# Patient Record
Sex: FEMALE | Race: WHITE | Employment: FULL TIME | URBAN - METROPOLITAN AREA
[De-identification: names, ages, dates, MRNs, and addresses within clinical notes are randomized per-mention and may not be internally consistent; named-entity substitution may affect disease eponyms.]

---

## 2017-04-05 ENCOUNTER — EMPLOYEE WELLNESS (OUTPATIENT)
Dept: OTHER | Age: 42
End: 2017-04-05

## 2017-04-05 LAB
CHOLESTEROL, TOTAL: 205 MG/DL (ref 0–199)
GLUCOSE BLD-MCNC: 67 MG/DL (ref 70–99)
HDLC SERPL-MCNC: 74 MG/DL (ref 40–60)
LDL CHOLESTEROL CALCULATED: 115 MG/DL
TRIGL SERPL-MCNC: 80 MG/DL (ref 0–150)

## 2018-03-20 VITALS — WEIGHT: 145 LBS | BODY MASS INDEX: 27.4 KG/M2

## 2020-09-21 ENCOUNTER — OFFICE VISIT (OUTPATIENT)
Dept: ORTHOPEDIC SURGERY | Age: 45
End: 2020-09-21
Payer: COMMERCIAL

## 2020-09-21 VITALS — TEMPERATURE: 97.4 F | WEIGHT: 156 LBS | HEIGHT: 61 IN | BODY MASS INDEX: 29.45 KG/M2

## 2020-09-21 PROCEDURE — 99203 OFFICE O/P NEW LOW 30 MIN: CPT | Performed by: ORTHOPAEDIC SURGERY

## 2020-09-21 PROCEDURE — G8419 CALC BMI OUT NRM PARAM NOF/U: HCPCS | Performed by: ORTHOPAEDIC SURGERY

## 2020-09-21 PROCEDURE — 1036F TOBACCO NON-USER: CPT | Performed by: ORTHOPAEDIC SURGERY

## 2020-09-21 PROCEDURE — G8427 DOCREV CUR MEDS BY ELIG CLIN: HCPCS | Performed by: ORTHOPAEDIC SURGERY

## 2020-09-21 RX ORDER — ESCITALOPRAM OXALATE 5 MG/1
TABLET ORAL
COMMUNITY

## 2020-09-21 RX ORDER — ALPRAZOLAM 1 MG/1
1 TABLET ORAL NIGHTLY PRN
COMMUNITY

## 2020-09-21 RX ORDER — ZOLPIDEM TARTRATE 5 MG/1
5 TABLET ORAL NIGHTLY PRN
COMMUNITY

## 2020-09-21 NOTE — LETTER
Physical Therapy Rehabilitation Referral    Patient Name: Nathalie Ojeda      YOB: 1975    Diagnosis:    Left lower extremity contusions-hip, ankle, knee  Precautions:   none  Date of Prescription:  9/21/20    [x] Evaluate and Treat        Modalities:    [x] Of Choice        [] Ultrasound       [] Iontophoresis      [] Moist heat     [] Massage         [x] Cryotherapy      [] Electrical stimulation     [] Paraffin  [] Whirlpool  [] TENS    [x] Home exercise program (copy to patient).         Perform exercises for:  15   minutes    2-3   times/day  [x] Supervised physical therapy  Frequency: []  1x week  [x] 2x week  [] 3x week  [] Other:   Duration: [] 2 weeks   [] 4 weeks  [x] 6 weeks  [] Other:     Additional Instructions:   ROM, strengthening exercises for all affected joints    Ankita Ga MD  Orthopaedic Fellow  [facility] and [address] [place] [address]

## 2020-09-21 NOTE — PROGRESS NOTES
[address]  Office Visit  New Patient  Date:  2020    Name:  Kelly Villa  Address:  [address] [place] [address] [address] [place] [address] WAmrita Dang [place]    :  1975      Age:   53 y.o.    SSN:  [id number]      Medical Record Number:  <[id number]>    Chief Complaint:    Left lower extremity pains    HPI:   Kelly Villa is a 53 y.o. female who is presenting for planes of left lower extremity pains. This is a Workmen's Compensation injury through [place]. She works at [employer] at [employer]. On  she was getting some medications utilizing a step stool on wheels which tipped over and she landed directly on her left side. She was evaluated and having back pain, left hip pain, knee pain, ankle pain. Her ankle and hip and back have improved but her knee pain has persisted. She notices pain/soreness with extended periods of weightbearing that are improved with rest.  Her primary care physician provided some narcotic pain medication along with anti-inflammatories which provide some relief. She has not had any history of physical therapy or intra-articular steroid injections. She describes no mechanical symptoms and no instability. She denies any new numbness, tingling, fevers, chills, chest pain, shortness of breath, or any other new significant symptoms.     Pain Assessment  Location of Pain: Knee  Location Modifiers: Left  Severity of Pain: 7  Quality of Pain: Throbbing, Aching, Sharp(spams)  Duration of Pain: Persistent  Frequency of Pain: Constant  Date Pain First Started: 20  Aggravating Factors: Bending, Walking  Limiting Behavior: Yes  Relieving Factors: Rest, Ice, Nsaids  Result of Injury: Yes  Work-Related Injury: Yes  Are there other pain locations you wish to document?: No    Past History:  Past Medical History:   Diagnosis Date    Anxiety     Sinus headache        Past Surgical History:   Procedure Laterality Date     SECTION, CLASSIC      x2  SINUS SURGERY      TUBAL LIGATION         Social History     Tobacco Use    Smoking status: Never Smoker    Smokeless tobacco: Never Used   Substance Use Topics    Alcohol use: No    Drug use: No        Family History:  family history includes Other in her father. Current Outpatient Medications:     escitalopram (LEXAPRO) 5 MG tablet, Take 5 mg by mouth every other day, Disp: , Rfl:     ALPRAZolam (XANAX) 1 MG tablet, Take 1 mg by mouth nightly as needed. , Disp: , Rfl:     zolpidem (AMBIEN) 5 MG tablet, Take 5 mg by mouth nightly as needed for Sleep., Disp: , Rfl:     escitalopram (LEXAPRO) 10 MG tablet, Take 10 mg by mouth every other day, Disp: , Rfl:     ibuprofen (ADVIL) 200 MG tablet, Take 2 tablets by mouth every 8 hours as needed, Disp: 120 tablet, Rfl: 0    alprazolam (XANAX) 0.5 MG tablet, Take 1 mg by mouth nightly as needed. , Disp: , Rfl:       No Known Allergies      Review of Systems: A 14 point review of systems was completed by the patient on 9/21/20 and is available in the media section of the scanned medical record and was reviewed today  The review is negative with the exception of those things mentioned in the HPI    No notes on file    Physical Exam:  Temp 97.4 °F (36.3 °C)   Ht 5' 1\" (1.549 m)   Wt 156 lb (70.8 kg)   BMI 29.48 kg/m²         General: No acute distress, well nourished  CV: No obvious peripheral edema.  Normal peripheral pulses  Neuro: Alert & oriented x 3  Psych: Appropriate mood and affect    Ankle/foot Examination: Left     Inspection:  No edema or wounds  Palpation: no tenderness over medial and lateral malleoli, anterior joint line, Achilles, base of the fifth metatarsal.  Range of Motion:  20 dorsiflexion, 40 plantarflexion  Strength:  5 /5 plantarflexion, 5/5 dorsiflexion, 5/5 inversion and eversion  Stability: neg anterior drawer and talar tilt  Neuro: Sensation equal bilateral lower extremities   Vascular: 2+ posterior tibialis pulse        Left encounter with Alvarez Ray was supervised by Dr Melonie Mcdaniel who personally examined the patient and reviewed the plan. This dictation was performed with a verbal recognition program (DRAGON) and it was checked for errors. It is possible that there are still dictated errors within this office note. If so, please bring any errors to my attention for an addendum. All efforts were made to ensure that this office note is accurate. Attestation:  I was physically present and performed my own examination of this patient and have discussed the case, including pertinent history and exam findings with the fellow. I agree with the documented assessment and plan. Darian Jaquez.  Melonie Mcdaniel MD

## 2020-09-23 ENCOUNTER — HOSPITAL ENCOUNTER (OUTPATIENT)
Dept: PHYSICAL THERAPY | Age: 45
Setting detail: THERAPIES SERIES
Discharge: HOME | End: 2020-09-23
Payer: COMMERCIAL

## 2020-09-23 PROCEDURE — 97110 THERAPEUTIC EXERCISES: CPT

## 2020-09-23 PROCEDURE — 97161 PT EVAL LOW COMPLEX 20 MIN: CPT

## 2020-09-23 PROCEDURE — 97530 THERAPEUTIC ACTIVITIES: CPT

## 2020-09-23 NOTE — PLAN OF CARE
[address] [address], [address]  Phone: [phone]   Fax: [phone]                                                       Physical Therapy Certification    Dear Referring Practitioner: Linda Witt MD,    We had the pleasure of evaluating the following patient for physical therapy services at [address] [place] [address]. A summary of our findings can be found in the initial assessment below. This includes our plan of care. If you have any questions or concerns regarding these findings, please do not hesitate to contact me at the office phone number checked above. Thank you for the referral.       Physician Signature:_______________________________Date:__________________  By signing above (or electronic signature), therapists plan is approved by physician      Patient: Jacque Goldberg   : 1975   MRN: [id number]  Referring Physician: Referring Practitioner: Linda Witt MD      Evaluation Date: 2020      Medical Diagnosis Information:  Diagnosis: S83. 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 She had low back, L Hip/Knee/Ankle pain immediately where sent went to ER. X-rays completed of all levels and were all normal. PCP gave patient muscle relaxer and pain medication that she took and improved pain slightly but still has significant knee pain.  She saw  Arnulfo Fuchs on Monday where he did a repeat x-ray and put her off work for 4 weeks, prescribing a NSAID and PT. Since incident, the knee pain has stayed the same. States she has muscle spasms in her quad at times that results in her feeling quivering of the muscle and tingling after. She states she lives on the third floor of an apartment complex and is going up the stairs backwards on her butt because she is unwilling to bend her knee. 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        OBJECTIVE:   Palpation: pain present with palpation of quad muscle belly, trigger points noted    Functional Mobility/Transfers: VERY GUARDED, does not allow for knee flexion with sit<>stand, in sitting, or any bed mobility    Posture: locked into knee extension on L, decreased WBing on L    Bandages/Dressings/Incisions: n/a    Gait: very antalgic/abnormal, locks L knee into extension as if in brace, does not allow for any knee flexion; foot flat contact, walking like L leg is single bone     Dermatomes Normal Abnormal Comments   inguinal area (L1)       anterior mid-thigh (L2) x     distal ant thigh/med knee (L3) x     medial lower leg and foot (L4) x     lateral lower leg and foot (L5) x     posterior calf (S1) x     medial calcaneus (S2) x         Reflexes Normal Abnormal Comments   S1-2 Seated achilles      S1-2 Prone knee bend      L3-4 ROM   []Decreased core/proximal hip strength and neuromuscular control   [x]Decreased LE functional strength   [x]Reduced balance/proprioceptive control   []other:      Functional Activity Limitations (from functional questionnaire and intake)   [x]Reduced ability to tolerate prolonged functional positions   [x]Reduced ability or difficulty with changes of positions or transfers between positions   [x]Reduced ability to maintain good posture and demonstrate good body mechanics with sitting, bending, and lifting   [x]Reduced ability to sleep   [x] Reduced ability or tolerance with driving and/or computer work   [x]Reduced ability to perform lifting, carrying tasks   [x]Reduced ability to squat   [x]Reduced ability to forward bend   [x]Reduced ability to ambulate prolonged functional periods/distances/surfaces   [x]Reduced ability to ascend/descend stairs   [x]Reduced ability to run, hop, cut or jump   []other:    Participation Restrictions   [x]Reduced participation in self care activities   [x]Reduced participation in home management activities   [x]Reduced participation in work activities   [x]Reduced participation in social activities. []Reduced participation in sport/recreation activities. Classification :    []Signs/symptoms consistent with post-surgical status including decreased ROM, strength and function.    [x]Signs/symptoms consistent with joint sprain/strain   []Signs/symptoms consistent with patella-femoral syndrome   []Signs/symptoms consistent with knee OA/hip OA   []Signs/symptoms consistent with internal derangement of knee/Hip   []Signs/symptoms consistent with functional hip weakness/NMR control      []Signs/symptoms consistent with tendinitis/tendinosis    []signs/symptoms consistent with pathology which may benefit from Dry needling      []other:      Prognosis/Rehab Potential:      []Excellent   [x]Good    []Fair   []Poor    Tolerance of evaluation/treatment: Access Code: [email or web address]   URL: [email or web address]   Date: 09/23/2020   Prepared by: Daniela Polanco     Exercises   Supine Heel Slide with Strap - 10 reps - 10s hold - 3x daily - 7x weekly   Prone Quadriceps Stretch with Strap - 3 reps - 30s hold - 3x daily - 7x weekly   Prone Knee Flexion - 10 reps - 3 sets - 2-3x daily - 7x weekly   Hip Extension with Leg Bent - 10 reps - 3 sets - 1x daily - 7x weekly   Standing Knee Flexion AROM with Chair Support - 10 reps - 3 sets - 2-3x daily - 7x weekly     GOALS:  Patient stated goal: return to walking normal, return to work  [] Progressing: [] Met: [] Not Met: [] Adjusted    Therapist goals for Patient:   Short Term Goals: To be achieved in: 2 weeks  1. Independent in HEP and progression per patient tolerance, in order to prevent re-injury. [] Progressing: [] Met: [] Not Met: [] Adjusted  2. Patient will have a decrease in pain to facilitate improvement in movement, function, and ADLs as indicated by Functional Deficits. [] Progressing: [] Met: [] Not Met: [] Adjusted    Long Term Goals: To be achieved in: 6 weeks  1. Disability index score of 20% or less for the LEFS to assist with reaching prior level of function. [] Progressing: [] Met: [] Not Met: [] Adjusted  2. Patient will demonstrate increased AROM to 0-140 to allow for proper joint functioning as indicated by patients Functional Deficits. [] Progressing: [] Met: [] Not Met: [] Adjusted  3. Patient will demonstrate an increase in Strength to at least 5/5 as well as good proximal hip strength and control to allow for proper functional mobility as indicated by patients Functional Deficits. [] Progressing: [] Met: [] Not Met: [] Adjusted  4. Patient will return to functional activities including walking and stair navigation without increased symptoms or restriction. [] Progressing: [] Met: [] Not Met: [] Adjusted  5.  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     [] Progressing: [] Met: [] Not Met: [] Adjusted     Electronically signed by:  Danica Palomares, PT, DPT

## 2020-09-23 NOTE — FLOWSHEET NOTE
[address] [facility] Physical Therapy  Phone: [phone]   Fax: [phone]    Physical Therapy Treatment Note/ Progress Report:     Date:  2020    Patient Name:  Ling Zavala    :  1975  MRN: [id number]  Restrictions/Precautions:    Medical/Treatment Diagnosis Information:  Diagnosis: S83. 92XA (ICD-10-CM) - Sprain of unspecified site of left knee, initial encounter  Treatment Diagnosis: fear/guarding of L knee flexion, decreased L knee ROM, strength, poor quad activation, abnormal gait   Insurance/Certification information:  PT Insurance Information: Worker's Comp  Physician Information:  Referring Practitioner: Gabi Read MD  Plan of care signed (Y/N): []  Yes [x]  No     Date of Patient follow up with Physician: 10/23/2020     Progress Report: [x]  Yes  []  No     Date Range for reporting period:  Beginnin2020  Ending:     Progress report due (10 Rx/or 30 days whichever is less): visit #10 or      Recertification due (POC duration/ or 90 days whichever is less): visit #10 or 2020 (end of presumptive visits)     Visit # Insurance Allowable Auth required?  Date Range   1 10 []  Yes  [x]  No 10 presumptive visits thru 2020     Latex Allergy:  [x]NO      []YES  Preferred Language for Healthcare:   [x]English       []other:    Functional Scale:        Date assessed:  LEFS: raw score = 12; dysfunction = 85%   2020    Pain level:  4-8/10     SUBJECTIVE:  See eval    OBJECTIVE: See eval      RESTRICTIONS/PRECAUTIONS: n/a    Exercises/Interventions:     Therapeutic Exercise (66360)  Resistance / level Sets/sec Reps Notes / Cues   Bike              Prone Quad Stretch  30\" 3    Heel Slides  10\" 5           Prone Knee Flexion  1 10    Prone Hip Ext w/ Knee Flexed  1 10    Standing HS Curl  1 10                                Therapeutic Activities (42665)                                   Neuromuscular Re-ed (76938)                            Manual Intervention ([id number])       Knee PROM       Tib/Fem Mobs       STM Quad npv      Ankle mobs                         Pt. Education:  -pt educated on diagnosis, prognosis and expectations for rehab  -all pt questions were answered    Home Exercise Program:  Access Code: [email or web address]   URL: [employer]/   Date: 09/23/2020   Prepared by: Rue Fresh     Exercises   · Supine Heel Slide with Strap - 10 reps - 10s hold - 3x daily - 7x weekly   · Prone Quadriceps Stretch with Strap - 3 reps - 30s hold - 3x daily - 7x weekly   · Prone Knee Flexion - 10 reps - 3 sets - 2-3x daily - 7x weekly   · Hip Extension with Leg Bent - 10 reps - 3 sets - 1x daily - 7x weekly   · Standing Knee Flexion AROM with Chair Support - 10 reps - 3 sets - 2-3x daily - 7x weekly     Therapeutic Exercise and NMR EXR  [x] (60360) Provided verbal/tactile cueing for activities related to strengthening, flexibility, endurance, ROM for improvements in LE, proximal hip, and core control with self care, mobility, lifting, ambulation.  [] (94393) Provided verbal/tactile cueing for activities related to improving balance, coordination, kinesthetic sense, posture, motor skill, proprioception  to assist with LE, proximal hip, and core control in self care, mobility, lifting, ambulation and eccentric single leg control.   [] (83548) Therapist is in constant attendance of 2 or more patients providing skilled therapy interventions, but not providing any significant amount of measurable one-on-one time to either patient, for improvements in LE, proximal hip, and core control in self care, mobility, lifting, ambulation and eccentric single leg control.      NMR and Therapeutic Activities:    [x] (52318 or 60933) Provided verbal/tactile cueing for activities related to improving balance, coordination, kinesthetic sense, posture, motor skill, proprioception and motor activation to allow for proper function of core, proximal hip and LE with self care and ADLs  [] (16117) Gait Re-education- Provided training and instruction to the patient for proper LE, core and proximal hip recruitment and positioning and eccentric body weight control with ambulation re-education including up and down stairs     Home Exercise Program:    [x] (69814) Reviewed/Progressed HEP activities related to strengthening, flexibility, endurance, ROM of core, proximal hip and LE for functional self-care, mobility, lifting and ambulation/stair navigation   [] (22111)Reviewed/Progressed HEP activities related to improving balance, coordination, kinesthetic sense, posture, motor skill, proprioception of core, proximal hip and LE for self care, mobility, lifting, and ambulation/stair navigation      Manual Treatments:  PROM / STM / Oscillations-Mobs:  G-I, II, III, IV (PA's, Inf., Post.)  [x] (36764) Provided manual therapy to mobilize LE, proximal hip and/or LS spine soft tissue/joints for the purpose of modulating pain, promoting relaxation,  increasing ROM, reducing/eliminating soft tissue swelling/inflammation/restriction, improving soft tissue extensibility and allowing for proper ROM for normal function with self care, mobility, lifting and ambulation. Modalities:  [] (11973) Vasopneumatic compression: Utilized vasopneumatic compression to decrease edema / swelling for the purpose of improving mobility and quad tone / recruitment which will allow for increased overall function including but not limited to self-care, transfers, ambulation, and ascending / descending stairs.        Modalities:      Charges:  Timed Code Treatment Minutes: 30   Total Treatment Minutes: 50     [x] EVAL - LOW (69859)   [] EVAL - MOD (41373)  [] EVAL - HIGH (21828)  [] RE-EVAL (24021)  [x] RA(79537) x 1     [] Ionto  [] NMR (69307) x      [] Vaso  [] Manual (75527) x      [] Ultrasound  [x] TA x 1      [] Mech Traction (66141)  [] Aquatic Therapy x     [] ES (un) (08379):   [] Home Management Training x [] ES(attended) (16845)   [] Group:     [] Other:     GOALS:   Patient stated goal: return to walking normal, return to work  []? Progressing: []? Met: []? Not Met: []? Adjusted     Therapist goals for Patient:   Short Term Goals: To be achieved in: 2 weeks  1. Independent in HEP and progression per patient tolerance, in order to prevent re-injury. []? Progressing: []? Met: []? Not Met: []? Adjusted  2. Patient will have a decrease in pain to facilitate improvement in movement, function, and ADLs as indicated by Functional Deficits. []? Progressing: []? Met: []? Not Met: []? Adjusted     Long Term Goals: To be achieved in: 6 weeks  1. Disability index score of 20% or less for the LEFS to assist with reaching prior level of function. []? Progressing: []? Met: []? Not Met: []? Adjusted  2. Patient will demonstrate increased AROM to 0-140 to allow for proper joint functioning as indicated by patients Functional Deficits. []? Progressing: []? Met: []? Not Met: []? Adjusted  3. Patient will demonstrate an increase in Strength to at least 5/5 as well as good proximal hip strength and control to allow for proper functional mobility as indicated by patients Functional Deficits. []? Progressing: []? Met: []? Not Met: []? Adjusted  4. Patient will return to functional activities including walking and stair navigation without increased symptoms or restriction. []? Progressing: []? Met: []? Not Met: []? Adjusted  5. 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 []? Progressing: []? Met: []? Not Met: []? Adjusted     Overall Progression Towards Functional goals/ Treatment Progress Update:  [] Patient is progressing as expected towards functional goals listed. [] Progression is slowed due to complexities/Impairments listed. [] Progression has been slowed due to co-morbidities.   [x] Plan just implemented, too soon to assess goals progression <30days   [] Goals require adjustment due to lack of progress  [] Patient is not progressing as expected and requires additional follow up with physician  [] Other    Persisting Functional Limitations/Impairments:  [x]Sitting [x]Standing   [x]Walking [x]Stairs   [x]Transfers [x]ADLs   [x]Squatting/bending [x]Kneeling  [x]Housework [x]Job related tasks  [x]Driving []Sports/Recreation   [x]Sleeping []Other:    ASSESSMENT:  See eval    Treatment/Activity Tolerance:  [] Pt able to complete treatment [] Patient limited by fatique  [x] Patient limited by pain  [] Patient limited by other medical complications  [] Other:     Prognosis: [x] Good [] Fair  [] Poor    Patient Requires Follow-up: [x] Yes  [] No    Return to Play:    [x]  N/A    PLAN: See eval. PT 1-2x / week for 6 weeks. 10 presumptive visits. [] Continue per plan of care [] Alter current plan (see comments)  [x] Plan of care initiated [] Hold pending MD visit [] Discharge    Electronically signed by: Herbert Madera PT, DPT      Note: If patient does not return for scheduled/ recommended follow up visits, this note will serve as a discharge from care along with most recent update on progress.

## 2020-09-24 ENCOUNTER — TELEPHONE (OUTPATIENT)
Dept: ORTHOPEDIC SURGERY | Age: 45
End: 2020-09-24

## 2020-09-24 NOTE — TELEPHONE ENCOUNTER
COMPLETED AND EMAILED RTW FORM FOR MARGA TO Nicho RODRIGUEZ/DR. SOSA TO OBTAIN SIGNATURE.  ASKED THAT SHE HAVE SIGNED AND THEN FAX ON AND SCAN INTO [employer] WITH CONFIRMATION

## 2020-09-30 ENCOUNTER — HOSPITAL ENCOUNTER (OUTPATIENT)
Dept: PHYSICAL THERAPY | Age: 45
Setting detail: THERAPIES SERIES
Discharge: HOME | End: 2020-09-30
Payer: COMMERCIAL

## 2020-09-30 NOTE — PROGRESS NOTES
Physical Therapy  Cancellation/No-show Note  Patient Name:  Dm Rogers  :  1975   Date:  2020  Cancelled visits to date: 1  No-shows to date: 0    Patient status for today's appointment patient:  [x]  Cancelled    []  Rescheduled appointment  []  No-show       Reason given by patient:  []  Patient ill  []  Conflicting appointment  []  No transportation    []  Conflict with work  []  No reason given  [x]  Other:     Comments:   - family emergency     Phone call information:   []  Phone call made today to patient at _ time at number provided:      []  Patient answered, conversation as follows:    []  Patient did not answer, message left as follows:  [x]  Phone call not made today  [] Phone call not made today - patient called in and provided reason for cancellation    Electronically signed by:  Reny Reynaga, PT, DPT

## 2020-10-02 ENCOUNTER — HOSPITAL ENCOUNTER (OUTPATIENT)
Dept: PHYSICAL THERAPY | Age: 45
Setting detail: THERAPIES SERIES
Discharge: HOME | End: 2020-10-02
Payer: COMMERCIAL

## 2020-10-02 NOTE — PROGRESS NOTES
Physical Therapy  Cancellation/No-show Note  Patient Name:  Sha Keen  :  1975   Date:  10/2/2020  Cancelled visits to date: 2  No-shows to date: 0    Patient status for today's appointment patient:  [x]  Cancelled , 10/2   []  Rescheduled appointment  []  No-show       Reason given by patient:  []  Patient ill  []  Conflicting appointment  []  No transportation    []  Conflict with work  []  No reason given  [x]  Other:     Comments:  10/2 - family emergency     Phone call information:   []  Phone call made today to patient at _ time at number provided:      []  Patient answered, conversation as follows:    []  Patient did not answer, message left as follows:  []  Phone call not made today  [x] Phone call not made today - patient called in and provided reason for cancellation    Electronically signed by:  Jaron King, PT, DPT

## 2020-10-06 ENCOUNTER — HOSPITAL ENCOUNTER (OUTPATIENT)
Dept: PHYSICAL THERAPY | Age: 45
Setting detail: THERAPIES SERIES
Discharge: HOME | End: 2020-10-06
Payer: COMMERCIAL

## 2020-10-06 PROCEDURE — 97140 MANUAL THERAPY 1/> REGIONS: CPT

## 2020-10-06 PROCEDURE — 97112 NEUROMUSCULAR REEDUCATION: CPT

## 2020-10-06 PROCEDURE — 97110 THERAPEUTIC EXERCISES: CPT

## 2020-10-06 NOTE — FLOWSHEET NOTE
[address] [facility] Physical Therapy  Phone: [phone]   Fax: [phone]    Physical Therapy Treatment Note/ Progress Report:     Date:  10/6/2020    Patient Name:  Dyllan Najera    :  1975  MRN: [id number]  Restrictions/Precautions:    Medical/Treatment Diagnosis Information:  Diagnosis: S83. 92XA (ICD-10-CM) - Sprain of unspecified site of left knee, initial encounter  Treatment Diagnosis: fear/guarding of L knee flexion, decreased L knee ROM, strength, poor quad activation, abnormal gait   Insurance/Certification information:  PT Insurance Information: Worker's Comp  Physician Information:  Referring Practitioner: Annabella Kirby MD  Plan of care signed (Y/N): [x]  Yes []  No     Date of Patient follow up with Physician: 10/23/2020     Progress Report: []  Yes  [x]  No     Date Range for reporting period:  Beginnin2020  Ending:     Progress report due (10 Rx/or 30 days whichever is less): visit #10 or      Recertification due (POC duration/ or 90 days whichever is less): visit #10 or 2020 (end of presumptive visits)     Visit # Insurance Allowable Auth required? Date Range   2 10 []  Yes  [x]  No 10 presumptive visits thru 2020     Latex Allergy:  [x]NO      []YES  Preferred Language for Healthcare:   [x]English       []other:    Functional Scale:        Date assessed:  LEFS: raw score = 12; dysfunction = 85%   2020    Pain level:  3-4/10     SUBJECTIVE:  Pt reports her movement is so much better in her knee and pain is significantly less by performing the HEP daily. She states she is incredibly happy she is progressing. Still having pain with stairs and long duration walking.      OBJECTIVE: See eval      RESTRICTIONS/PRECAUTIONS: n/a    Exercises/Interventions:     Therapeutic Exercise (27406)  Resistance / level Sets/sec Reps Notes / Cues   Bike  5'            Standing Knee Flexor Stretch  10\" 10    Prone Quad Stretch  30\" 3    Leg off EOB Hip Flexor Stretch  2'     Heel Slides            Prone Knee Flexion 2# 3\" hold 20    Prone Hip Ext w/ Knee Flexed Pillow under hips 2 10    Standing HS Curl  2 10                                Therapeutic Activities (89127)       Squat  1 15    Stair Navigation Ascend/  descend 3 steps 5 Cueing for alignment, performance, and eliminating compensations                  Neuromuscular Re-ed (93903)       LSU 4\" step 2 10    SLS w/ clock taps  3 taps 10           Manual Intervention (05342)       Knee PROM       Tib/Fem Mobs       STM Quad & Pinch then Stretch  10'     Stick to [employer]  5'  Performed in long and short sit                     Pt. Education:  -pt educated on diagnosis, prognosis and expectations for rehab  -all pt questions were answered    Home Exercise Program:  Access Code: [email or web address]   URL: [employer]/   Date: 09/23/2020   Prepared by: Baron Orta     Exercises   · Supine Heel Slide with Strap - 10 reps - 10s hold - 3x daily - 7x weekly   · Prone Quadriceps Stretch with Strap - 3 reps - 30s hold - 3x daily - 7x weekly   · Prone Knee Flexion - 10 reps - 3 sets - 2-3x daily - 7x weekly   · Hip Extension with Leg Bent - 10 reps - 3 sets - 1x daily - 7x weekly   · Standing Knee Flexion AROM with Chair Support - 10 reps - 3 sets - 2-3x daily - 7x weekly     Date: 10/06/2020 (same code)  Exercises   Supine Quadriceps Stretch with Strap on Table - 10 reps - 30s hold - 1x daily - 7x weekly   Squat with Chair Touch - 10 reps - 3 sets - 1x daily - 7x weekly   Lateral Step Down - 10 reps - 3 sets - 1x daily - 7x weekly   Single Leg Balance with Clock Reach - 10 reps - 3 sets - 1x daily - 7x weekly     Therapeutic Exercise and NMR EXR  [x] (26039) Provided verbal/tactile cueing for activities related to strengthening, flexibility, endurance, ROM for improvements in LE, proximal hip, and core control with self care, mobility, lifting, ambulation.  [] (11560) Provided verbal/tactile cueing for activities related to improving balance, coordination, kinesthetic sense, posture, motor skill, proprioception  to assist with LE, proximal hip, and core control in self care, mobility, lifting, ambulation and eccentric single leg control.   [] (20530) Therapist is in constant attendance of 2 or more patients providing skilled therapy interventions, but not providing any significant amount of measurable one-on-one time to either patient, for improvements in LE, proximal hip, and core control in self care, mobility, lifting, ambulation and eccentric single leg control.      NMR and Therapeutic Activities:    [x] (71153 or 75357) Provided verbal/tactile cueing for activities related to improving balance, coordination, kinesthetic sense, posture, motor skill, proprioception and motor activation to allow for proper function of core, proximal hip and LE with self care and ADLs  [] (98895) Gait Re-education- Provided training and instruction to the patient for proper LE, core and proximal hip recruitment and positioning and eccentric body weight control with ambulation re-education including up and down stairs     Home Exercise Program:    [x] (23605) Reviewed/Progressed HEP activities related to strengthening, flexibility, endurance, ROM of core, proximal hip and LE for functional self-care, mobility, lifting and ambulation/stair navigation   [] (88019)Reviewed/Progressed HEP activities related to improving balance, coordination, kinesthetic sense, posture, motor skill, proprioception of core, proximal hip and LE for self care, mobility, lifting, and ambulation/stair navigation      Manual Treatments:  PROM / STM / Oscillations-Mobs:  G-I, II, III, IV (PA's, Inf., Post.)  [x] (03707) Provided manual therapy to mobilize LE, proximal hip and/or LS spine soft tissue/joints for the purpose of modulating pain, promoting relaxation,  increasing ROM, reducing/eliminating soft tissue swelling/inflammation/restriction, improving soft tissue extensibility and allowing for proper ROM for normal function with self care, mobility, lifting and ambulation. Modalities:  [] (18965) Vasopneumatic compression: Utilized vasopneumatic compression to decrease edema / swelling for the purpose of improving mobility and quad tone / recruitment which will allow for increased overall function including but not limited to self-care, transfers, ambulation, and ascending / descending stairs. Modalities:      Charges:  Timed Code Treatment Minutes: 42   Total Treatment Minutes: 42     [] EVAL - LOW (65635)   [] EVAL - MOD (05831)  [] EVAL - HIGH (06730)  [] RE-EVAL (34104)  [x] DK(23132) x 1     [] Ionto  [x] NMR (74860) x 1      [] Vaso  [x] Manual (28817) x 1      [] Ultrasound  [] TA x 1      [] Mech Traction (19832)  [] Aquatic Therapy x     [] ES (un) (81255):   [] Home Management Training x [] ES(attended) (76788)   [] Group:     [] Other:     GOALS:   Patient stated goal: return to walking normal, return to work  []? Progressing: []? Met: []? Not Met: []? Adjusted     Therapist goals for Patient:   Short Term Goals: To be achieved in: 2 weeks  1. Independent in HEP and progression per patient tolerance, in order to prevent re-injury. []? Progressing: []? Met: []? Not Met: []? Adjusted  2. Patient will have a decrease in pain to facilitate improvement in movement, function, and ADLs as indicated by Functional Deficits. []? Progressing: []? Met: []? Not Met: []? Adjusted     Long Term Goals: To be achieved in: 6 weeks  1. Disability index score of 20% or less for the LEFS to assist with reaching prior level of function. []? Progressing: []? Met: []? Not Met: []? Adjusted  2. Patient will demonstrate increased AROM to 0-140 to allow for proper joint functioning as indicated by patients Functional Deficits. []? Progressing: []? Met: []? Not Met: []? Adjusted  3.  Patient will demonstrate an increase in Strength to at least 5/5 as well as good proximal hip strength and control to allow for proper functional mobility as indicated by patients Functional Deficits. []? Progressing: []? Met: []? Not Met: []? Adjusted  4. Patient will return to functional activities including walking and stair navigation without increased symptoms or restriction. []? Progressing: []? Met: []? Not Met: []? Adjusted  5. 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 []? Progressing: []? Met: []? Not Met: []? Adjusted     Overall Progression Towards Functional goals/ Treatment Progress Update:  [] Patient is progressing as expected towards functional goals listed. [] Progression is slowed due to complexities/Impairments listed. [] Progression has been slowed due to co-morbidities. [x] Plan just implemented, too soon to assess goals progression <30days   [] Goals require adjustment due to lack of progress  [] Patient is not progressing as expected and requires additional follow up with physician  [] Other    Persisting Functional Limitations/Impairments:  [x]Sitting [x]Standing   [x]Walking [x]Stairs   [x]Transfers [x]ADLs   [x]Squatting/bending [x]Kneeling  [x]Housework [x]Job related tasks  [x]Driving []Sports/Recreation   [x]Sleeping []Other:    ASSESSMENT:  Pt with much improved AROM and ability to complete exercises with only mild pain. Pt has significant muscle tightness / trigger points in distal quad muscle. Pt able to complete all new exercises as outlined in bold above. Focus on eccentric control of quadriceps at upcoming appts. Pt cont to benefit from skilled PT.      Treatment/Activity Tolerance:  [x] Pt able to complete treatment [] Patient limited by fatique  [x] Patient limited by pain  [] Patient limited by other medical complications  [] Other:     Prognosis: [x] Good [] Fair  [] Poor    Patient Requires Follow-up: [x] Yes  [] No    Return to Play:    [x]  N/A    PLAN: See nancy. PT 1-2x / week for 6 weeks. 10 presumptive visits. [x] Continue per plan of care [] Alter current plan (see comments)  [] Plan of care initiated [] Hold pending MD visit [] Discharge    Electronically signed by: Crystal Sanz PT, DPT      Note: If patient does not return for scheduled/ recommended follow up visits, this note will serve as a discharge from care along with most recent update on progress.

## 2020-10-09 ENCOUNTER — HOSPITAL ENCOUNTER (OUTPATIENT)
Dept: PHYSICAL THERAPY | Age: 45
Setting detail: THERAPIES SERIES
Discharge: HOME | End: 2020-10-09
Payer: COMMERCIAL

## 2020-10-09 NOTE — PROGRESS NOTES
Physical Therapy  Cancellation/No-show Note  Patient Name:  Gricel Patel  :  1975   Date:  10/9/2020  Cancelled visits to date: 2  No-shows to date: 0    Patient status for today's appointment patient:  []  [address] [facility] , 10/2   []  Rescheduled appointment  [x]  No-show 10/9     Reason given by patient:  []  Patient ill  []  Conflicting appointment  []  No transportation    []  Conflict with work  [x]  No reason given  []  Other:     Comments:    Phone call information:   [x]  Phone call made today to patient at 2:47pm at number provided:   [phone]    []  Patient answered, conversation as follows:    [x]  Patient did not answer, message left as follows: Missed todays appt, next appt Tues.  10/13 at 1pm   []  Phone call not made today  [] Phone call not made today - patient called in and provided reason for cancellation    Electronically signed by:  Jessica Warner, PT, DPT

## 2020-10-13 ENCOUNTER — HOSPITAL ENCOUNTER (OUTPATIENT)
Dept: PHYSICAL THERAPY | Age: 45
Setting detail: THERAPIES SERIES
Discharge: HOME | End: 2020-10-13
Payer: COMMERCIAL

## 2020-10-14 ENCOUNTER — TELEPHONE (OUTPATIENT)
Dept: ORTHOPEDIC SURGERY | Age: 45
End: 2020-10-14

## 2020-10-14 NOTE — TELEPHONE ENCOUNTER
GAVE [facility] MEDICAL RECORDS 01/2015 TO PRESENT TO DAHLIA CANO TO SCAN IN MRO TO [address] [place] [address]

## 2020-10-23 ENCOUNTER — HOSPITAL ENCOUNTER (OUTPATIENT)
Dept: PHYSICAL THERAPY | Age: 45
Setting detail: THERAPIES SERIES
Discharge: HOME | End: 2020-10-23
Payer: COMMERCIAL

## 2020-10-23 ENCOUNTER — OFFICE VISIT (OUTPATIENT)
Dept: ORTHOPEDIC SURGERY | Age: 45
End: 2020-10-23
Payer: COMMERCIAL

## 2020-10-23 VITALS — WEIGHT: 156 LBS | HEIGHT: 61 IN | TEMPERATURE: 97.4 F | BODY MASS INDEX: 29.45 KG/M2

## 2020-10-23 PROCEDURE — 99213 OFFICE O/P EST LOW 20 MIN: CPT | Performed by: ORTHOPAEDIC SURGERY

## 2020-10-23 NOTE — PROGRESS NOTES
Physical Therapy  Cancellation/No-show Note  Patient Name:  Nathen Chan  :  1975   Date:  10/23/2020  Cancelled visits to date: 3  No-shows to date: 2    Patient status for today's appointment patient:  [x]  Cancelled , 10/2, 10/23   []  Rescheduled appointment  []  No-show 10/9, 10/13     Reason given by patient:  []  Patient ill  []  Conflicting appointment  []  No transportation    []  Conflict with work  []  No reason given  [x]  Other:     Comments: 10/23 - pt states she wants to make sure PT is covered by worker's comp,  informed her of 10 presumptive visits prior to 2020, she responded that wasn't \"good enough\" and wants to \"be sure\" that PT was covered before she comes back in. States she is expecting a call this afternoon to confirm. Will cancel future appts if she does not get coverage confirmed.      Phone call information:   []  Phone call made today to patient at [facility] at number provided:   [phone]    []  Patient answered, conversation as follows:    []  Patient did not answer, message left as follows:  []  Phone call not made today  [x] Phone call not made today - patient called in and provided reason for cancellation    Electronically signed by:  Phuong Owens, PT, DPT

## 2020-10-27 ENCOUNTER — APPOINTMENT (OUTPATIENT)
Dept: PHYSICAL THERAPY | Age: 45
End: 2020-10-27
Payer: COMMERCIAL

## 2020-10-30 ENCOUNTER — APPOINTMENT (OUTPATIENT)
Dept: PHYSICAL THERAPY | Age: 45
End: 2020-10-30
Payer: COMMERCIAL

## 2020-11-09 ENCOUNTER — TELEPHONE (OUTPATIENT)
Dept: ORTHOPEDIC SURGERY | Age: 45
End: 2020-11-09

## 2020-11-09 NOTE — TELEPHONE ENCOUNTER
Appointment Request     Patient requesting earlier appointment: No  Appointment offered to patient: Nirmal Sheehan  Patient Contact Number: [phone]

## 2020-11-13 ENCOUNTER — TELEPHONE (OUTPATIENT)
Dept: ORTHOPEDIC SURGERY | Age: 45
End: 2020-11-13

## 2020-11-16 NOTE — TELEPHONE ENCOUNTER
Called and spoke with dalton --  MRI approved patient # given for [facility] scheduling to schedule

## 2020-11-20 ENCOUNTER — HOSPITAL ENCOUNTER (OUTPATIENT)
Dept: MRI IMAGING | Age: 45
Discharge: HOME | End: 2020-11-20
Payer: COMMERCIAL

## 2020-11-20 PROCEDURE — 73721 MRI JNT OF LWR EXTRE W/O DYE: CPT

## 2020-11-24 ENCOUNTER — TELEPHONE (OUTPATIENT)
Dept: ORTHOPEDIC SURGERY | Age: 45
End: 2020-11-24

## 2020-11-25 ENCOUNTER — TELEPHONE (OUTPATIENT)
Dept: ORTHOPEDIC SURGERY | Age: 45
End: 2020-11-25

## 2020-11-27 ENCOUNTER — TELEPHONE (OUTPATIENT)
Dept: ORTHOPEDIC SURGERY | Age: 45
End: 2020-11-27

## 2020-12-03 ENCOUNTER — VIRTUAL VISIT (OUTPATIENT)
Dept: ORTHOPEDIC SURGERY | Age: 45
End: 2020-12-03
Payer: COMMERCIAL

## 2020-12-03 PROCEDURE — 99213 OFFICE O/P EST LOW 20 MIN: CPT | Performed by: ORTHOPAEDIC SURGERY

## 2020-12-03 NOTE — PROGRESS NOTES
education: Not on file      Highest education level: Not on file    Occupational History      Not on file    Social Needs      Financial resource strain: Not on file      Food insecurity        Worry: Not on file        Inability: Not on file      Transportation needs        Medical: Not on file        Non-medical: Not on file    Tobacco Use      Smoking status: Never Smoker      Smokeless tobacco: Never Used    Substance and Sexual Activity      Alcohol use: No      Drug use: No      Sexual activity: Yes        Partners: Male    Lifestyle      Physical activity        Days per week: Not on file        Minutes per session: Not on file      Stress: Not on file    Relationships      Social connections        Talks on phone: Not on file        Gets together: Not on file        Attends Holiness service: Not on file        Active member of club or organization: Not on file        Attends meetings of clubs or organizations: Not on file        Relationship status: Not on file      Intimate partner violence        Fear of current or ex partner: Not on file        Emotionally abused: Not on file        Physically abused: Not on file        Forced sexual activity: Not on file    Other Topics      Concerns:        Not on file    Social History Narrative      Not on file      Current Outpatient Medications:  escitalopram (LEXAPRO) 5 MG tablet, Take 5 mg by mouth every other day, Disp: , Rfl:   ALPRAZolam (XANAX) 1 MG tablet, Take 1 mg by mouth nightly as needed. , Disp: , Rfl:   zolpidem (AMBIEN) 5 MG tablet, Take 5 mg by mouth nightly as needed for Sleep., Disp: , Rfl:   diclofenac (VOLTAREN) 50 MG EC tablet, Take 1 tablet by mouth 2 times daily (with meals), Disp: 60 tablet, Rfl: 3  ibuprofen (ADVIL) 200 MG tablet, Take 2 tablets by mouth every 8 hours as needed, Disp: 120 tablet, Rfl: 0    No current facility-administered medications for this visit.        No Known Allergies    VITAL SIGNS:  There were no vitals taken for this visit. When questioned she still has a pain in her left knee and this is mainly medially. She not get any true mechanical symptoms such as locking or giving way. She has not had an intra-articular steroid injection but she has been on some anti-inflammatory in the past.    Her MRI shows possibly a degenerative medial meniscus tear and probably a partial ACL tear but there are still fibers intact. When we examined her we felt she had a normal Lachman's and pivot shift. We discussed the findings and what this means. I told her if I was still treating her that I would try to one steroid injection to see if this relieves her pain but if she was not relieved of her symptoms within a week or 10 days it she would have to make up her mind whether she can live with this pain or she could have a diagnostic arthroscopy. 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 Plan: She has yet to find an orthopedic surgeon where she has moved but she says she will do this. She wrote down the diagnosis. We will hear from her as needed.

## 2020-12-04 ENCOUNTER — TELEPHONE (OUTPATIENT)
Dept: ORTHOPEDIC SURGERY | Age: 45
End: 2020-12-04

## 2020-12-04 NOTE — TELEPHONE ENCOUNTER
Other Iw called and would like for her medical notes and any XR and MRI's to be sent to [facility]  at [facility] Ph. [phone]

## 2020-12-07 ENCOUNTER — TELEPHONE (OUTPATIENT)
Dept: ORTHOPEDIC SURGERY | Age: 45
End: 2020-12-07

## 2020-12-10 ENCOUNTER — TELEPHONE (OUTPATIENT)
Dept: ORTHOPEDIC SURGERY | Age: 45
End: 2020-12-10

## 2020-12-10 NOTE — TELEPHONE ENCOUNTER
Other Patient is requesting a medical release be emailed to her email so that she can sign and get it faxed back to the office. [email or web address] She would also like a call back when complete.   [phone]

## 2020-12-11 ENCOUNTER — TELEPHONE (OUTPATIENT)
Dept: ORTHOPEDIC SURGERY | Age: 45
End: 2020-12-11

## 2020-12-18 ENCOUNTER — TELEPHONE (OUTPATIENT)
Dept: ORTHOPEDIC SURGERY | Age: 45
End: 2020-12-18

## 2020-12-22 ENCOUNTER — TELEPHONE (OUTPATIENT)
Dept: ORTHOPEDIC SURGERY | Age: 45
End: 2020-12-22

## 2020-12-23 ENCOUNTER — TELEPHONE (OUTPATIENT)
Dept: ORTHOPEDIC SURGERY | Age: 45
End: 2020-12-23

## 2020-12-30 ENCOUNTER — TELEPHONE (OUTPATIENT)
Dept: ORTHOPEDIC SURGERY | Age: 45
End: 2020-12-30